# Patient Record
Sex: FEMALE | Race: OTHER | Employment: OTHER | URBAN - METROPOLITAN AREA
[De-identification: names, ages, dates, MRNs, and addresses within clinical notes are randomized per-mention and may not be internally consistent; named-entity substitution may affect disease eponyms.]

---

## 2017-02-09 ENCOUNTER — HOSPITAL ENCOUNTER (OUTPATIENT)
Dept: MRI IMAGING | Age: 82
Discharge: HOME | End: 2017-02-09
Attending: FAMILY MEDICINE
Payer: MEDICARE

## 2017-02-09 DIAGNOSIS — M25.519 SHOULDER PAIN: ICD-10-CM

## 2017-02-09 PROCEDURE — 73221 MRI JOINT UPR EXTREM W/O DYE: CPT

## 2017-03-18 ENCOUNTER — HOSPITAL ENCOUNTER (OUTPATIENT)
Dept: MAMMOGRAPHY | Age: 82
Discharge: HOME | End: 2017-03-18
Attending: FAMILY MEDICINE
Payer: MEDICARE

## 2017-03-18 DIAGNOSIS — Z12.39 BREAST CANCER SCREENING: ICD-10-CM

## 2017-03-18 PROCEDURE — 77067 SCR MAMMO BI INCL CAD: CPT

## 2017-03-27 ENCOUNTER — HOSPITAL ENCOUNTER (EMERGENCY)
Age: 82
Discharge: HOME | End: 2017-03-27
Attending: EMERGENCY MEDICINE
Payer: MEDICARE

## 2017-03-27 ENCOUNTER — APPOINTMENT (OUTPATIENT)
Dept: GENERAL RADIOLOGY | Age: 82
End: 2017-03-27
Attending: EMERGENCY MEDICINE
Payer: MEDICARE

## 2017-03-27 VITALS
TEMPERATURE: 98 F | HEART RATE: 82 BPM | SYSTOLIC BLOOD PRESSURE: 143 MMHG | RESPIRATION RATE: 16 BRPM | DIASTOLIC BLOOD PRESSURE: 53 MMHG | OXYGEN SATURATION: 99 %

## 2017-03-27 DIAGNOSIS — S29.019A STRAIN OF THORACIC SPINE, INITIAL ENCOUNTER: Primary | ICD-10-CM

## 2017-03-27 PROCEDURE — 99283 EMERGENCY DEPT VISIT LOW MDM: CPT

## 2017-03-27 PROCEDURE — 72072 X-RAY EXAM THORAC SPINE 3VWS: CPT

## 2017-03-27 RX ORDER — IBUPROFEN 600 MG/1
600 TABLET ORAL ONCE
Status: COMPLETED | OUTPATIENT
Start: 2017-03-27 | End: 2017-03-27

## 2017-03-27 NOTE — ED PROVIDER NOTES
Patient Seen in: THE [facility] Emergency Department In [place]    History   Patient presents with:  Back Pain (musculoskeletal)    Stated Complaint: upper back pain since yesterday, denies injury; denies chest pain, SOB     HPI    29-year-old female presents TABLET WEEKLY TAKEN 30 MINUTES BEFORE A MEAL       No family history on file.       Smoking Status: Never Smoker                      Alcohol Use: No                Review of Systems    Positive for stated complaint: upper back pain since yesterday, denies Technologist)  Sudden onset pain upper half of thoracic spine midline and left side for past 2 days. No injury. 3/27/2017  CONCLUSION:  Preservation thoracic vertebral body height. Mild dextroscoliosis with multilevel endplate hypertrophic changes.

## 2017-03-31 ENCOUNTER — HOSPITAL ENCOUNTER (OUTPATIENT)
Dept: MAMMOGRAPHY | Age: 82
Discharge: HOME | End: 2017-03-31
Attending: FAMILY MEDICINE
Payer: MEDICARE

## 2017-03-31 DIAGNOSIS — R92.8 ABNORMAL MAMMOGRAM OF RIGHT BREAST: ICD-10-CM

## 2017-03-31 PROCEDURE — 77061 BREAST TOMOSYNTHESIS UNI: CPT

## 2017-03-31 PROCEDURE — 77065 DX MAMMO INCL CAD UNI: CPT

## 2017-08-16 ENCOUNTER — OFFICE VISIT (OUTPATIENT)
Dept: PHYSICAL THERAPY | Age: 82
End: 2017-08-16
Attending: FAMILY MEDICINE
Payer: MEDICARE

## 2017-08-16 DIAGNOSIS — M25.511 BILATERAL SHOULDER PAIN: ICD-10-CM

## 2017-08-16 DIAGNOSIS — G89.29 OTHER CHRONIC PAIN: ICD-10-CM

## 2017-08-16 DIAGNOSIS — M25.512 BILATERAL SHOULDER PAIN: ICD-10-CM

## 2017-08-16 PROCEDURE — 97163 PT EVAL HIGH COMPLEX 45 MIN: CPT

## 2017-08-16 PROCEDURE — 97110 THERAPEUTIC EXERCISES: CPT

## 2017-08-16 NOTE — PROGRESS NOTES
UPPER EXTREMITY EVALUATION:   Referring Physician: Dr. Aba Ball  Diagnosis: Chronic pain B shoulders Date of Service: 8/16/2017     PATIENT SUMMARY   Sai Montenegro is a 80year old y/o female who presents to therapy today with complaints of L sided neck, s pectorals, B upper traps, B levators, B SCM, moderate tenderness in L cervical paraspinals, L upper traps and medial border of L scapula, cervical compression (+) relief of pain, cervical distraction (+) increase in pain, Spurling's (-), Full can (+) LUE, precautions, treatment plan. She was also educated on importance of compliance with HEP. Patient was instructed in and issued a HEP for: upper thoracic seated stretch with rolled towel, shoulder rolls backward, wand exercise standing flexion.  No increase agreed to actively participate in planning and for this course of care. Thank you for your referral. Please co-sign or sign and return this letter via fax as soon as possible to [phone].  If you have any questions, please contact me at Dept: [phone]

## 2017-08-23 ENCOUNTER — OFFICE VISIT (OUTPATIENT)
Dept: PHYSICAL THERAPY | Age: 82
End: 2017-08-23
Attending: FAMILY MEDICINE
Payer: MEDICARE

## 2017-08-23 DIAGNOSIS — M25.512 BILATERAL SHOULDER PAIN: ICD-10-CM

## 2017-08-23 DIAGNOSIS — G89.29 OTHER CHRONIC PAIN: ICD-10-CM

## 2017-08-23 DIAGNOSIS — M25.511 BILATERAL SHOULDER PAIN: ICD-10-CM

## 2017-08-23 PROCEDURE — 97110 THERAPEUTIC EXERCISES: CPT

## 2017-08-23 PROCEDURE — 97140 MANUAL THERAPY 1/> REGIONS: CPT

## 2017-08-23 NOTE — PROGRESS NOTES
Dx: Chronic pain B shoulders       Authorized # of Visits: NA   Next MD visit: none scheduled  Fall Risk: standard         Precautions: n/a             Subjective: Patient states that she did her HEP.  She states that there is no pain at rest but when she m

## 2017-08-24 ENCOUNTER — OFFICE VISIT (OUTPATIENT)
Dept: PHYSICAL THERAPY | Age: 82
End: 2017-08-24
Attending: FAMILY MEDICINE
Payer: MEDICARE

## 2017-08-24 DIAGNOSIS — G89.29 OTHER CHRONIC PAIN: ICD-10-CM

## 2017-08-24 DIAGNOSIS — M25.512 BILATERAL SHOULDER PAIN: ICD-10-CM

## 2017-08-24 DIAGNOSIS — M25.511 BILATERAL SHOULDER PAIN: ICD-10-CM

## 2017-08-24 PROCEDURE — 97140 MANUAL THERAPY 1/> REGIONS: CPT

## 2017-08-24 PROCEDURE — 97110 THERAPEUTIC EXERCISES: CPT

## 2017-08-24 NOTE — PROGRESS NOTES
Dx: Chronic pain B shoulders       Authorized # of Visits: NA   Next MD visit: none scheduled  Fall Risk: standard         Precautions: n/a             Subjective: Patient's  daughter-in-law states that patient was able to attend a party last night.  Nehemias HP x 15 min L cervical and scapular area X 15 min         Upper cycle 3'F 3'B                                   Skilled Services: Manual therapy, verbal cues for form during exercises    Charges: Naina 2( 30 min) MT1 ( 15 min)   Total Timed Treatment: 45 m

## 2017-08-28 ENCOUNTER — OFFICE VISIT (OUTPATIENT)
Dept: PHYSICAL THERAPY | Age: 82
End: 2017-08-28
Attending: FAMILY MEDICINE
Payer: MEDICARE

## 2017-08-28 DIAGNOSIS — M25.512 BILATERAL SHOULDER PAIN: ICD-10-CM

## 2017-08-28 DIAGNOSIS — M25.511 BILATERAL SHOULDER PAIN: ICD-10-CM

## 2017-08-28 DIAGNOSIS — G89.29 OTHER CHRONIC PAIN: ICD-10-CM

## 2017-08-28 PROCEDURE — 97110 THERAPEUTIC EXERCISES: CPT

## 2017-08-28 PROCEDURE — 97014 ELECTRIC STIMULATION THERAPY: CPT

## 2017-08-28 NOTE — PROGRESS NOTES
Dx: Chronic pain B shoulders       Authorized # of Visits: NA   Next MD visit: none scheduled  Fall Risk: standard         Precautions: n/a             Subjective: Patient's daughter-in-law states that patient had severe pain on L side of neck and headache sec x 4 each       HP x 15 min L cervical and scapular area X 15 min -        Upper cycle 3'F 3'B L=2          Red band scapular retraction x 20         Red band B shoulder ext x 20         Red band B shoulder ER at 0 deg abd x 20       Skilled Services: M

## 2017-08-30 ENCOUNTER — OFFICE VISIT (OUTPATIENT)
Dept: PHYSICAL THERAPY | Age: 82
End: 2017-08-30
Attending: FAMILY MEDICINE
Payer: MEDICARE

## 2017-08-30 DIAGNOSIS — G89.29 OTHER CHRONIC PAIN: ICD-10-CM

## 2017-08-30 DIAGNOSIS — M25.511 BILATERAL SHOULDER PAIN: ICD-10-CM

## 2017-08-30 DIAGNOSIS — M25.512 BILATERAL SHOULDER PAIN: ICD-10-CM

## 2017-08-30 PROCEDURE — 97110 THERAPEUTIC EXERCISES: CPT

## 2017-08-30 PROCEDURE — 97014 ELECTRIC STIMULATION THERAPY: CPT

## 2017-08-30 NOTE — PROGRESS NOTES
Dx: Chronic pain B shoulders       Authorized # of Visits: NA   Next MD visit: none scheduled  Fall Risk: standard         Precautions: n/a             Subjective: Patient's daughter-in-law states that patient L shoulder felt much better after the electric border of L scapula x 15 min X 15 min IFC B upper traps and medial border of scapula x 15 min with HP X 15 min      Manual stretch B upper traps, levator and SCM 15 sec x 4 each 15 sec x 4 each Swiss ball on table push to R & L 5 sec x 10      HP x 15 min

## 2017-09-06 ENCOUNTER — APPOINTMENT (OUTPATIENT)
Dept: PHYSICAL THERAPY | Age: 82
End: 2017-09-06
Attending: FAMILY MEDICINE
Payer: MEDICARE

## 2017-09-13 ENCOUNTER — APPOINTMENT (OUTPATIENT)
Dept: PHYSICAL THERAPY | Age: 82
End: 2017-09-13
Attending: FAMILY MEDICINE
Payer: MEDICARE

## 2017-09-13 ENCOUNTER — OFFICE VISIT (OUTPATIENT)
Dept: PHYSICAL THERAPY | Age: 82
End: 2017-09-13
Attending: FAMILY MEDICINE
Payer: MEDICARE

## 2017-09-13 PROCEDURE — 97110 THERAPEUTIC EXERCISES: CPT

## 2017-09-13 PROCEDURE — 97014 ELECTRIC STIMULATION THERAPY: CPT

## 2017-09-13 NOTE — PROGRESS NOTES
Dx: Chronic pain B shoulders       Authorized # of Visits: NA   Next MD visit: none scheduled  Fall Risk: standard         Precautions: n/a             Subjective: Patient's daughter-in-law states that according to patient, she has good and bad days.  Mario B upper traps, medial border of L scapula x 15 min X 15 min IFC B upper traps and medial border of scapula x 15 min with HP X 15 min X 15 min     Manual stretch B upper traps, levator and SCM 15 sec x 4 each 15 sec x 4 each Swiss ball on table push to R &

## 2017-09-14 ENCOUNTER — OFFICE VISIT (OUTPATIENT)
Dept: PHYSICAL THERAPY | Age: 82
End: 2017-09-14
Attending: FAMILY MEDICINE
Payer: MEDICARE

## 2017-09-14 ENCOUNTER — APPOINTMENT (OUTPATIENT)
Dept: PHYSICAL THERAPY | Age: 82
End: 2017-09-14
Attending: FAMILY MEDICINE
Payer: MEDICARE

## 2017-09-14 PROCEDURE — 97110 THERAPEUTIC EXERCISES: CPT

## 2017-09-14 PROCEDURE — 97014 ELECTRIC STIMULATION THERAPY: CPT

## 2017-09-14 NOTE — PROGRESS NOTES
Dx: Chronic pain B shoulders       Authorized # of Visits: NA   Next MD visit: none scheduled  Fall Risk: standard         Precautions: n/a            Discharge Summary    Pt has attended 7, cancelled 0, and no shown 0 visits in Physical Therapy.      FOTO HEP to maintain progress achieved in PT.- Met    Goal status G Code:  CK  Discharge status G Code:  CO     Plan: Most goals set during initial evaluation have been met.  Patient was encouraged to be consistently compliant with given HEP to [facility] each 15 sec x 4 each Swiss ball on table push to R & L 5 sec x 10 5 sec x 10 5 sec x 10    HP x 15 min L cervical and scapular area X 15 min - - - -     Upper cycle 3'F 3'B L=2  L=2 3'F, 3'B L=2   4'F, 4'B 4'F 4'B      Red band scapular retraction x 20 X 2

## 2018-01-05 ENCOUNTER — HOSPITAL ENCOUNTER (OUTPATIENT)
Dept: BONE DENSITY | Age: 83
Discharge: HOME | End: 2018-01-05
Attending: FAMILY MEDICINE
Payer: MEDICARE

## 2018-01-05 DIAGNOSIS — M81.0 OSTEOPOROSIS: ICD-10-CM

## 2018-01-05 PROCEDURE — 77080 DXA BONE DENSITY AXIAL: CPT | Performed by: FAMILY MEDICINE

## 2018-03-19 ENCOUNTER — HOSPITAL ENCOUNTER (OUTPATIENT)
Dept: MAMMOGRAPHY | Age: 83
Discharge: HOME | End: 2018-03-19
Attending: FAMILY MEDICINE
Payer: MEDICARE

## 2018-03-19 DIAGNOSIS — Z12.31 ENCOUNTER FOR SCREENING MAMMOGRAM FOR MALIGNANT NEOPLASM OF BREAST: ICD-10-CM

## 2018-03-19 PROCEDURE — 77067 SCR MAMMO BI INCL CAD: CPT | Performed by: FAMILY MEDICINE

## 2018-04-07 ENCOUNTER — HOSPITAL ENCOUNTER (EMERGENCY)
Age: 83
Discharge: HOME | End: 2018-04-07
Attending: EMERGENCY MEDICINE
Payer: MEDICARE

## 2018-04-07 ENCOUNTER — APPOINTMENT (OUTPATIENT)
Dept: CT IMAGING | Age: 83
End: 2018-04-07
Attending: EMERGENCY MEDICINE
Payer: MEDICARE

## 2018-04-07 ENCOUNTER — APPOINTMENT (OUTPATIENT)
Dept: GENERAL RADIOLOGY | Age: 83
End: 2018-04-07
Attending: EMERGENCY MEDICINE
Payer: MEDICARE

## 2018-04-07 VITALS
WEIGHT: 118 LBS | SYSTOLIC BLOOD PRESSURE: 113 MMHG | DIASTOLIC BLOOD PRESSURE: 49 MMHG | HEART RATE: 77 BPM | HEIGHT: 60 IN | BODY MASS INDEX: 23.16 KG/M2 | TEMPERATURE: 98 F | RESPIRATION RATE: 16 BRPM | OXYGEN SATURATION: 97 %

## 2018-04-07 DIAGNOSIS — N30.00 ACUTE CYSTITIS WITHOUT HEMATURIA: ICD-10-CM

## 2018-04-07 DIAGNOSIS — R07.9 RIGHT-SIDED CHEST PAIN: Primary | ICD-10-CM

## 2018-04-07 DIAGNOSIS — K21.9 GASTROESOPHAGEAL REFLUX DISEASE, ESOPHAGITIS PRESENCE NOT SPECIFIED: ICD-10-CM

## 2018-04-07 PROCEDURE — 99285 EMERGENCY DEPT VISIT HI MDM: CPT

## 2018-04-07 PROCEDURE — 81001 URINALYSIS AUTO W/SCOPE: CPT | Performed by: EMERGENCY MEDICINE

## 2018-04-07 PROCEDURE — 36415 COLL VENOUS BLD VENIPUNCTURE: CPT

## 2018-04-07 PROCEDURE — 93005 ELECTROCARDIOGRAM TRACING: CPT

## 2018-04-07 PROCEDURE — 93010 ELECTROCARDIOGRAM REPORT: CPT

## 2018-04-07 PROCEDURE — 80053 COMPREHEN METABOLIC PANEL: CPT | Performed by: EMERGENCY MEDICINE

## 2018-04-07 PROCEDURE — 87086 URINE CULTURE/COLONY COUNT: CPT | Performed by: EMERGENCY MEDICINE

## 2018-04-07 PROCEDURE — 71275 CT ANGIOGRAPHY CHEST: CPT | Performed by: EMERGENCY MEDICINE

## 2018-04-07 PROCEDURE — 85025 COMPLETE CBC W/AUTO DIFF WBC: CPT | Performed by: EMERGENCY MEDICINE

## 2018-04-07 PROCEDURE — 85378 FIBRIN DEGRADE SEMIQUANT: CPT | Performed by: EMERGENCY MEDICINE

## 2018-04-07 PROCEDURE — 84484 ASSAY OF TROPONIN QUANT: CPT | Performed by: EMERGENCY MEDICINE

## 2018-04-07 PROCEDURE — 71046 X-RAY EXAM CHEST 2 VIEWS: CPT | Performed by: EMERGENCY MEDICINE

## 2018-04-07 RX ORDER — NITROFURANTOIN 25; 75 MG/1; MG/1
100 CAPSULE ORAL 2 TIMES DAILY
Qty: 20 CAPSULE | Refills: 0 | Status: SHIPPED | OUTPATIENT
Start: 2018-04-07 | End: 2018-04-17

## 2018-04-07 NOTE — ED INITIAL ASSESSMENT (HPI)
2 months of bilateral breast pain,greater on the right with mild chest palpation, denies cough or fever

## 2018-04-07 NOTE — ED PROVIDER NOTES
Patient Seen in: [facility] Emergency Department In [place]    History   Patient presents with:  Rash Skin Problem (integumentary)  Breast Problem (mammary)    Stated Complaint: painful rash    HPI     Should not presents with right chest pain.   The patie (5')   Wt 53.5 kg   SpO2 99%   BMI 23.05 kg/m²         Physical Exam    General: Awake and alert, in no acute distress. HEENT: Normocephalic, atraumatic, pupils equal round and reactive to light, oropharynx clear, uvula midline. Neck: Supple.   Cardiovasc ------                     CBC W/ DIFFERENTIAL[[id number]]                              Final result                 Please view results for these tests on the individual orders.    RAINBOW DRAW BLUE   RAINBOW DRAW LAVENDER   RAINBOW DRAW LIGHT GREEN AORTA:  No thoracic aortic aneurysm. LUNGS:  Mild respiratory motion Bilateral subsegmental atelectasis. No focal pulmonary infiltrate or consolidation. . MEDIASTINUM/FLY:  Slight interval enlargement of the right soft tissue in the anterior neck mass deep was evaluated with a computer-aided device.   This patient's information has been entered into a reminder system with a target due date for the next mammogram.      Dictated by: Lupis Babcock MD on 3/19/2018 at 14:36     Approved by: Lupis Babcock MD

## 2018-06-09 ENCOUNTER — APPOINTMENT (OUTPATIENT)
Dept: GENERAL RADIOLOGY | Age: 83
End: 2018-06-09
Attending: EMERGENCY MEDICINE
Payer: MEDICARE

## 2018-06-09 ENCOUNTER — HOSPITAL ENCOUNTER (EMERGENCY)
Age: 83
Discharge: HOME | End: 2018-06-09
Attending: EMERGENCY MEDICINE
Payer: MEDICARE

## 2018-06-09 VITALS
WEIGHT: 120 LBS | SYSTOLIC BLOOD PRESSURE: 132 MMHG | HEIGHT: 60 IN | HEART RATE: 74 BPM | RESPIRATION RATE: 18 BRPM | BODY MASS INDEX: 23.56 KG/M2 | DIASTOLIC BLOOD PRESSURE: 65 MMHG | OXYGEN SATURATION: 96 % | TEMPERATURE: 98 F

## 2018-06-09 DIAGNOSIS — M54.6 ACUTE BILATERAL THORACIC BACK PAIN: Primary | ICD-10-CM

## 2018-06-09 PROCEDURE — 85025 COMPLETE CBC W/AUTO DIFF WBC: CPT | Performed by: EMERGENCY MEDICINE

## 2018-06-09 PROCEDURE — 71046 X-RAY EXAM CHEST 2 VIEWS: CPT | Performed by: EMERGENCY MEDICINE

## 2018-06-09 PROCEDURE — 93005 ELECTROCARDIOGRAM TRACING: CPT

## 2018-06-09 PROCEDURE — 93010 ELECTROCARDIOGRAM REPORT: CPT

## 2018-06-09 PROCEDURE — 72050 X-RAY EXAM NECK SPINE 4/5VWS: CPT | Performed by: EMERGENCY MEDICINE

## 2018-06-09 PROCEDURE — 96374 THER/PROPH/DIAG INJ IV PUSH: CPT

## 2018-06-09 PROCEDURE — 99285 EMERGENCY DEPT VISIT HI MDM: CPT

## 2018-06-09 PROCEDURE — 80048 BASIC METABOLIC PNL TOTAL CA: CPT | Performed by: EMERGENCY MEDICINE

## 2018-06-09 PROCEDURE — 84484 ASSAY OF TROPONIN QUANT: CPT | Performed by: EMERGENCY MEDICINE

## 2018-06-09 RX ORDER — METHYLPREDNISOLONE 4 MG/1
TABLET ORAL
Qty: 1 PACKAGE | Refills: 0 | Status: SHIPPED | OUTPATIENT
Start: 2018-06-09 | End: 2018-06-14

## 2018-06-09 RX ORDER — KETOROLAC TROMETHAMINE 30 MG/ML
15 INJECTION, SOLUTION INTRAMUSCULAR; INTRAVENOUS ONCE
Status: COMPLETED | OUTPATIENT
Start: 2018-06-09 | End: 2018-06-09

## 2018-06-09 RX ORDER — TRAMADOL HYDROCHLORIDE 50 MG/1
TABLET ORAL EVERY 4 HOURS PRN
Qty: 10 TABLET | Refills: 0 | Status: SHIPPED | OUTPATIENT
Start: 2018-06-09 | End: 2018-06-16

## 2018-06-09 NOTE — ED PROVIDER NOTES
Patient Seen in: [facility] In [place]    History   Patient presents with:  Back Pain (musculoskeletal)    Stated Complaint: back and shoulder pain     HPI    Patient here for evaluation of neck and bilateral shoulder pain.    pain goe Eyes: EOM are normal. Pupils are equal, round, and reactive to light. Neck: Normal range of motion. Neck supple. No JVD present. Cardiovascular: Normal rate and regular rhythm. Pulmonary/Chest: Effort normal and breath sounds normal. No stridor. Course as of Jun 09 1328  ------------------------------------------------------------      MDM       Strongly suspect muscular skeletal etiology of her pain, but given her age we did do an EKG and some blood work.   We will also obtain X.  EKG is reassurin Print Script, Disp-10 tablet, R-0    methylPREDNISolone 4 MG Oral Tablet Therapy Pack  Dosepack: use as directed on packaging, Normal, Disp-1 Package, R-0

## 2018-06-09 NOTE — ED INITIAL ASSESSMENT (HPI)
Pt c/o upper,mid back, bilateral shoulders and neck pain that started this morning. Denies injury or fall. Pt denies SOB, chest pain.  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

## 2018-07-09 ENCOUNTER — OFFICE VISIT (OUTPATIENT)
Dept: PHYSICAL THERAPY | Age: 83
End: 2018-07-09
Attending: FAMILY MEDICINE
Payer: MEDICARE

## 2018-07-09 DIAGNOSIS — M75.02 ADHESIVE BURSITIS OF LEFT SHOULDER: ICD-10-CM

## 2018-07-09 PROCEDURE — 97163 PT EVAL HIGH COMPLEX 45 MIN: CPT

## 2018-07-09 PROCEDURE — 97110 THERAPEUTIC EXERCISES: CPT

## 2018-07-09 NOTE — PROGRESS NOTES
UPPER EXTREMITY EVALUATION:   Referring Physician: Dr. Isabelle Butts  Diagnosis: adhesive capsulitis L  shoulder  Date of Service: 7/9/2018     PATIENT SUMMARY   Rosa Younger is a [de-identified]year old y/o female who presents to therapy today with complaints of LEILA.  Evaluation findings are consistent with diagnosis. Tomasa Knox would benefit from skilled Physical Therapy to address the above impairments and to work on goals listed below.     Precautions:  None  OBJECTIVE:   Observation/Posture: forward head, rounded s Edinson High Complexity, Thera Ex1      Total Timed Treatment: 15 min     Total Treatment Time: 50 min     PLAN OF CARE:    Goals:  (to be met in 10 visits)  · Pt will report improved ability to sleep without waking due to shoulder pain.   · Pt will improve sh [phone]    Sincerely,  Electronically signed by therapist: Maria C Gross PT    [de-identified] certification required: Yes  I certify the need for these services furnished under this plan of treatment and while under my care.     X____________________________

## 2018-07-11 ENCOUNTER — OFFICE VISIT (OUTPATIENT)
Dept: PHYSICAL THERAPY | Age: 83
End: 2018-07-11
Attending: FAMILY MEDICINE
Payer: MEDICARE

## 2018-07-11 PROCEDURE — 97014 ELECTRIC STIMULATION THERAPY: CPT

## 2018-07-11 PROCEDURE — 97110 THERAPEUTIC EXERCISES: CPT

## 2018-07-11 NOTE — PROGRESS NOTES
Dx: adhesive capsulitis L  shoulder        Authorized # of Visits:  10 requested       Next MD visit: none scheduled  Fall Risk: standard         Precautions: n/a             Subjective: Patient reports compliance with HEP.  She states that L shoulder feels session. Date: 7/11/2018  Tx#: 2/ Date: Tx#: 3/ Date: Tx#: 4/ Date: Tx#: 5/ Date: Tx#: 6/ Date: Tx#: 7/ Date:    Tx#: 8/   Upper cycle x 6 min         Overhead pulley shoulder flexion x 20         Overhead shoulder abd (scaption) x 20         S

## 2018-07-16 ENCOUNTER — OFFICE VISIT (OUTPATIENT)
Dept: PHYSICAL THERAPY | Age: 83
End: 2018-07-16
Attending: FAMILY MEDICINE
Payer: MEDICARE

## 2018-07-16 ENCOUNTER — APPOINTMENT (OUTPATIENT)
Dept: PHYSICAL THERAPY | Age: 83
End: 2018-07-16
Payer: MEDICARE

## 2018-07-16 ENCOUNTER — TELEPHONE (OUTPATIENT)
Dept: PHYSICAL THERAPY | Age: 83
End: 2018-07-16

## 2018-07-16 PROCEDURE — 97014 ELECTRIC STIMULATION THERAPY: CPT

## 2018-07-16 PROCEDURE — 97110 THERAPEUTIC EXERCISES: CPT

## 2018-07-16 NOTE — PROGRESS NOTES
Dx: adhesive capsulitis L  shoulder        Authorized # of Visits:  10 requested       Next MD visit: none scheduled  Fall Risk: standard         Precautions: n/a             Subjective: Patient states that she went back to the doctor today to have the lum 6 min        Overhead pulley shoulder flexion x 20 X 20        Overhead shoulder abd (scaption) x 20 X 20        Standing AAROM with dowel B shoulder flexion 2 x 10 Yellow band scapular retraction x 20        Standing AAROM with dowel L shoulder abd 2 x 10

## 2018-07-18 ENCOUNTER — OFFICE VISIT (OUTPATIENT)
Dept: PHYSICAL THERAPY | Age: 83
End: 2018-07-18
Attending: FAMILY MEDICINE
Payer: MEDICARE

## 2018-07-18 PROCEDURE — 97014 ELECTRIC STIMULATION THERAPY: CPT

## 2018-07-18 PROCEDURE — 97110 THERAPEUTIC EXERCISES: CPT

## 2018-07-18 NOTE — PROGRESS NOTES
Dx: adhesive capsulitis L  shoulder        Authorized # of Visits:  10 requested       Next MD visit: none scheduled  Fall Risk: standard         Precautions: n/a             Subjective: Patient states that L shoulder feels better.  She states that pain is (scaption) x 20 X 20 X 20       Standing AAROM with dowel B shoulder flexion 2 x 10 Yellow band scapular retraction x 20 X 20       Standing AAROM with dowel L shoulder abd 2 x 10 Yellow band B shoulder ext x 20 X 20       Standing B serratus punch with do

## 2018-07-23 ENCOUNTER — OFFICE VISIT (OUTPATIENT)
Dept: PHYSICAL THERAPY | Age: 83
End: 2018-07-23
Attending: FAMILY MEDICINE
Payer: MEDICARE

## 2018-07-23 PROCEDURE — 97110 THERAPEUTIC EXERCISES: CPT

## 2018-07-23 NOTE — PROGRESS NOTES
Dx: adhesive capsulitis L  shoulder        Authorized # of Visits:  10 requested       Next MD visit: none scheduled  Fall Risk: standard         Precautions: n/a             Subjective: Patient states that her L shoulder continues to feel better.  She stat X 20 X 20 X 20      Standing AAROM with dowel B shoulder flexion 2 x 10 Yellow band scapular retraction x 20 X 20 Red band x 20      Standing AAROM with dowel L shoulder abd 2 x 10 Yellow band B shoulder ext x 20 X 20 Red band x 20      Standing B serratus

## 2018-07-25 ENCOUNTER — OFFICE VISIT (OUTPATIENT)
Dept: PHYSICAL THERAPY | Age: 83
End: 2018-07-25
Attending: FAMILY MEDICINE
Payer: MEDICARE

## 2018-07-25 PROCEDURE — 97110 THERAPEUTIC EXERCISES: CPT

## 2018-07-25 PROCEDURE — 97014 ELECTRIC STIMULATION THERAPY: CPT

## 2018-07-25 NOTE — PROGRESS NOTES
Dx: adhesive capsulitis L  shoulder        Authorized # of Visits:  10 requested       Next MD visit: none scheduled  Fall Risk: standard         Precautions: n/a             Subjective: Patient states that L shoulder is hurting today.  She states that she retraction x 20 X 20 Red band x 20 X 20     Standing AAROM with dowel L shoulder abd 2 x 10 Yellow band B shoulder ext x 20 X 20 Red band x 20 X 20     Standing B serratus punch with dowel 2 x 10 Yellow band B shoulder ER at 0 deg abd x 20 X 20 Red band x

## 2018-07-30 ENCOUNTER — OFFICE VISIT (OUTPATIENT)
Dept: PHYSICAL THERAPY | Age: 83
End: 2018-07-30
Attending: FAMILY MEDICINE
Payer: MEDICARE

## 2018-07-30 PROCEDURE — 97110 THERAPEUTIC EXERCISES: CPT

## 2018-07-30 PROCEDURE — 97014 ELECTRIC STIMULATION THERAPY: CPT

## 2018-07-30 NOTE — PROGRESS NOTES
Dx: adhesive capsulitis L  shoulder        Authorized # of Visits:  10 requested       Next MD visit: none scheduled  Fall Risk: standard         Precautions: n/a             Subjective: Patient states that L shoulder does not feel too bad today.  She state x 10 Yellow band scapular retraction x 20 X 20 Red band x 20 X 20 2 x 15    Standing AAROM with dowel L shoulder abd 2 x 10 Yellow band B shoulder ext x 20 X 20 Red band x 20 X 20 2 x 15    Standing B serratus punch with dowel 2 x 10 Yellow band B shoulder

## 2018-08-01 ENCOUNTER — OFFICE VISIT (OUTPATIENT)
Dept: PHYSICAL THERAPY | Age: 83
End: 2018-08-01
Attending: FAMILY MEDICINE
Payer: MEDICARE

## 2018-08-01 PROCEDURE — 97110 THERAPEUTIC EXERCISES: CPT

## 2018-08-01 PROCEDURE — 97014 ELECTRIC STIMULATION THERAPY: CPT

## 2018-08-01 NOTE — PROGRESS NOTES
Dx: adhesive capsulitis L  shoulder        Authorized # of Visits:  10 requested       Next MD visit: none scheduled  Fall Risk: standard         Precautions: n/a             Subjective: Patient states that L shoulder and scapular region is more sore today Overhead shoulder abd (scaption) x 20 X 20 X 20 X 20 X 20 X 25 X 25   Standing AAROM with dowel B shoulder flexion 2 x 10 Yellow band scapular retraction x 20 X 20 Red band x 20 X 20 2 x 15 Green band 2 x 10   Standing AAROM with dowel L shoulder abd 2 x

## 2018-08-09 ENCOUNTER — OFFICE VISIT (OUTPATIENT)
Dept: PHYSICAL THERAPY | Age: 83
End: 2018-08-09
Attending: FAMILY MEDICINE
Payer: MEDICARE

## 2018-08-09 PROCEDURE — 97014 ELECTRIC STIMULATION THERAPY: CPT

## 2018-08-09 PROCEDURE — 97110 THERAPEUTIC EXERCISES: CPT

## 2018-08-09 NOTE — PROGRESS NOTES
Dx: adhesive capsulitis L  shoulder        Authorized # of Visits:  10 requested       Next MD visit: none scheduled  Fall Risk: standard         Precautions: n/a             Subjective: Patient states that B shoulders are hurting today, about an 8/10 in i Upper cycle x 6 min X 6 min X 6 min X 6 min X 6 min X 7 min X 6 min X 7 min    Overhead pulley shoulder flexion x 20 X 20 X 20 X 20 X 20 X 25 X 25  X 25    Overhead shoulder abd (scaption) x 20 X 20 X 20 X 20 X 20 X 25 X 25 X 25    Standing AAROM with do

## 2018-08-15 ENCOUNTER — OFFICE VISIT (OUTPATIENT)
Dept: PHYSICAL THERAPY | Age: 83
End: 2018-08-15
Attending: FAMILY MEDICINE
Payer: MEDICARE

## 2018-08-15 PROCEDURE — 97110 THERAPEUTIC EXERCISES: CPT

## 2018-08-15 PROCEDURE — 97014 ELECTRIC STIMULATION THERAPY: CPT

## 2018-08-15 NOTE — PROGRESS NOTES
Dx: adhesive capsulitis L  shoulder        Authorized # of Visits:  10 requested       Next MD visit: none scheduled  Fall Risk: standard         Precautions: n/a            Discharge Summary    Pt has attended 10, cancelled 0, and no shown 0 visits in y achieved in PT.- Met    Discharge G Code: Carrying, Moving and Handling Objects CJ: 20-39% impaired, limited, or restricted  Projected G Code: Carrying, Moving and Handling Objects CK: 40-59% impaired, limited, or restricted    Plan: All goals set during i shelf of cabinet to overhead shelf 2 x 10 R 2 x 10   Standing B serratus punch with dowel 2 x 10 Yellow band B shoulder ER at 0 deg abd x 20 X 20 Red band x 20 X 20 2 x 15 Green band 2 x 10 2 x 12 2 x 12   AP glide L GHJ Grade III 30 sec x 2 Grade III 30 s

## 2019-03-29 ENCOUNTER — HOSPITAL ENCOUNTER (EMERGENCY)
Age: 84
Discharge: HOME | End: 2019-03-29
Attending: EMERGENCY MEDICINE
Payer: MEDICARE

## 2019-03-29 ENCOUNTER — APPOINTMENT (OUTPATIENT)
Dept: GENERAL RADIOLOGY | Age: 84
End: 2019-03-29
Attending: EMERGENCY MEDICINE
Payer: MEDICARE

## 2019-03-29 VITALS
SYSTOLIC BLOOD PRESSURE: 108 MMHG | WEIGHT: 120.13 LBS | DIASTOLIC BLOOD PRESSURE: 66 MMHG | BODY MASS INDEX: 23 KG/M2 | TEMPERATURE: 99 F | HEART RATE: 72 BPM | OXYGEN SATURATION: 98 % | RESPIRATION RATE: 16 BRPM

## 2019-03-29 DIAGNOSIS — R07.89 CHEST WALL PAIN: Primary | ICD-10-CM

## 2019-03-29 PROCEDURE — 99285 EMERGENCY DEPT VISIT HI MDM: CPT

## 2019-03-29 PROCEDURE — 93010 ELECTROCARDIOGRAM REPORT: CPT

## 2019-03-29 PROCEDURE — 96374 THER/PROPH/DIAG INJ IV PUSH: CPT

## 2019-03-29 PROCEDURE — 85025 COMPLETE CBC W/AUTO DIFF WBC: CPT | Performed by: EMERGENCY MEDICINE

## 2019-03-29 PROCEDURE — 71046 X-RAY EXAM CHEST 2 VIEWS: CPT | Performed by: EMERGENCY MEDICINE

## 2019-03-29 PROCEDURE — 80053 COMPREHEN METABOLIC PANEL: CPT | Performed by: EMERGENCY MEDICINE

## 2019-03-29 PROCEDURE — 84484 ASSAY OF TROPONIN QUANT: CPT | Performed by: EMERGENCY MEDICINE

## 2019-03-29 PROCEDURE — 93005 ELECTROCARDIOGRAM TRACING: CPT

## 2019-03-29 RX ORDER — CYCLOBENZAPRINE HCL 5 MG
5 TABLET ORAL 3 TIMES DAILY PRN
Qty: 15 TABLET | Refills: 0 | Status: SHIPPED | OUTPATIENT
Start: 2019-03-29

## 2019-03-29 RX ORDER — KETOROLAC TROMETHAMINE 30 MG/ML
30 INJECTION, SOLUTION INTRAMUSCULAR; INTRAVENOUS ONCE
Status: COMPLETED | OUTPATIENT
Start: 2019-03-29 | End: 2019-03-29

## 2019-03-29 NOTE — ED PROVIDER NOTES
Patient Seen in: [facility] In [place]    History   Patient presents with:  Chest Pain Angina (cardiovascular)    Stated Complaint: chest pain    HPI    74-year-old female presents with chest pain.   Symptoms began 1 day ago and were a moist   Neck: supple, no rigidity   Lungs: good air exchange and clear   Heart: regular rate rhythm and no murmur. There is tenderness on palpation over the anterior chest wall on the left side.   There is also tenderness along the trapezius muscles in the radiographs were obtained. PATIENT STATED HISTORY: (As transcribed by Technologist)  Chest pain for 2 days. FINDINGS:  LUNGS:  No focal consolidation. Normal vascularity. CARDIAC:  Normal size cardiac silhouette.  MEDIASTINUM:  Hernia appearing similar present. There was no indication for further evaluation, treatment or admission on an emergency basis. Comprehensive verbal and written discharge and follow-up instructions were provided to help prevent relapse or worsening.   Patient was instructed to fo

## 2019-03-29 NOTE — ED INITIAL ASSESSMENT (HPI)
C/o intermittent left chest pain radiates to back for 2 days worse on palpation. C/o left arm numbness also.  Denies SOB

## 2019-04-28 ENCOUNTER — APPOINTMENT (OUTPATIENT)
Dept: GENERAL RADIOLOGY | Age: 84
End: 2019-04-28
Attending: EMERGENCY MEDICINE
Payer: MEDICARE

## 2019-04-28 ENCOUNTER — HOSPITAL ENCOUNTER (EMERGENCY)
Age: 84
Discharge: HOME | End: 2019-04-28
Attending: EMERGENCY MEDICINE
Payer: MEDICARE

## 2019-04-28 VITALS
WEIGHT: 124 LBS | RESPIRATION RATE: 22 BRPM | SYSTOLIC BLOOD PRESSURE: 133 MMHG | HEIGHT: 61 IN | TEMPERATURE: 97 F | HEART RATE: 71 BPM | OXYGEN SATURATION: 98 % | DIASTOLIC BLOOD PRESSURE: 71 MMHG | BODY MASS INDEX: 23.41 KG/M2

## 2019-04-28 DIAGNOSIS — M54.9 BACK PAIN WITHOUT RADIATION: Primary | ICD-10-CM

## 2019-04-28 PROCEDURE — 99283 EMERGENCY DEPT VISIT LOW MDM: CPT

## 2019-04-28 PROCEDURE — 72100 X-RAY EXAM L-S SPINE 2/3 VWS: CPT | Performed by: EMERGENCY MEDICINE

## 2019-04-28 RX ORDER — CYCLOBENZAPRINE HCL 10 MG
10 TABLET ORAL 3 TIMES DAILY PRN
Qty: 20 TABLET | Refills: 0 | Status: SHIPPED | OUTPATIENT
Start: 2019-04-28

## 2019-04-28 RX ORDER — IBUPROFEN 600 MG/1
600 TABLET ORAL EVERY 8 HOURS PRN
Qty: 30 TABLET | Refills: 0 | Status: SHIPPED | OUTPATIENT
Start: 2019-04-28 | End: 2019-05-05

## 2019-04-28 NOTE — ED PROVIDER NOTES
Patient Seen in: [facility] In [place]    History   Patient presents with:  Fall (musculoskeletal, neurologic)    Stated Complaint: fall down 2 stairs at home on her back, denies hitting head, LOC; takes aspirin    HPI    82year-old and nontender, no JVD, trachea midline, No LAD  Heart: S1S2 normal. No murmurs, regular rate and rhythm, nontender  Lungs: Clear to auscultation bilaterally  Abdomen: Soft nontender nondistended normal active bowel sounds without rebound, guarding or alexandra Impression:  Back pain without radiation  (primary encounter diagnosis)    Disposition:  Discharge  4/28/2019  7:02 pm    Follow-up:  Ivy Zelaya MD  [address] [place] [id number] [phone]    Schedule an appointment as soon as poss

## 2019-04-28 NOTE — ED INITIAL ASSESSMENT (HPI)
Pt's son states that pt fell down 2 stairs today at home. Denies hitting head, LOC. Pt c/o mid to lower back pain with a \"reddened\" area. States she takes aspirin. Pt is Japanese-speaking.

## 2019-04-30 ENCOUNTER — HOSPITAL ENCOUNTER (OUTPATIENT)
Dept: MAMMOGRAPHY | Age: 84
Discharge: HOME | End: 2019-04-30
Attending: FAMILY MEDICINE
Payer: MEDICARE

## 2019-04-30 DIAGNOSIS — Z12.39 ENCOUNTER FOR SCREENING FOR MALIGNANT NEOPLASM OF BREAST: ICD-10-CM

## 2019-04-30 PROCEDURE — 77063 BREAST TOMOSYNTHESIS BI: CPT | Performed by: FAMILY MEDICINE

## 2019-04-30 PROCEDURE — 77067 SCR MAMMO BI INCL CAD: CPT | Performed by: FAMILY MEDICINE

## 2019-06-27 ENCOUNTER — APPOINTMENT (OUTPATIENT)
Dept: GENERAL RADIOLOGY | Age: 84
End: 2019-06-27
Attending: PHYSICIAN ASSISTANT
Payer: MEDICARE

## 2019-06-27 ENCOUNTER — HOSPITAL ENCOUNTER (EMERGENCY)
Age: 84
Discharge: HOME | End: 2019-06-27
Payer: MEDICARE

## 2019-06-27 VITALS
WEIGHT: 120 LBS | HEIGHT: 60 IN | BODY MASS INDEX: 23.56 KG/M2 | HEART RATE: 77 BPM | DIASTOLIC BLOOD PRESSURE: 56 MMHG | OXYGEN SATURATION: 97 % | RESPIRATION RATE: 16 BRPM | TEMPERATURE: 98 F | SYSTOLIC BLOOD PRESSURE: 132 MMHG

## 2019-06-27 DIAGNOSIS — M79.89 THUMB SWELLING: Primary | ICD-10-CM

## 2019-06-27 PROCEDURE — 99283 EMERGENCY DEPT VISIT LOW MDM: CPT

## 2019-06-27 PROCEDURE — 73140 X-RAY EXAM OF FINGER(S): CPT | Performed by: PHYSICIAN ASSISTANT

## 2019-06-27 RX ORDER — PREDNISONE 20 MG/1
40 TABLET ORAL DAILY
Qty: 10 TABLET | Refills: 0 | Status: SHIPPED | OUTPATIENT
Start: 2019-06-27 | End: 2019-07-02

## 2019-06-27 RX ORDER — CEPHALEXIN 500 MG/1
500 CAPSULE ORAL 3 TIMES DAILY
Qty: 21 CAPSULE | Refills: 0 | Status: SHIPPED | OUTPATIENT
Start: 2019-06-27 | End: 2019-07-04

## 2019-06-27 NOTE — ED INITIAL ASSESSMENT (HPI)
Woke up with swelling to left thumb x 2 days. No known injury/trauma. States she is unable to bend thumb.   +pain

## 2019-06-27 NOTE — ED PROVIDER NOTES
Patient Seen in: [facility] Department In [place]    History   Patient presents with:  Upper Extremity Injury (musculoskeletal)    Stated Complaint: LEFT THUMB SWELLING NO KNOWN INJ    HPI    Patient is a 68-year-old female.   She arrives with he and pain to palpation isolated to the PIP joint of the left thumb. MCP joint is spared. No sign lymphangitis. Normal cap refill. Back: Full range of motion  Skin: Erythema, warmth and swelling to the PIP joint of the left thumb.   Neuro: Cranial nerves [address] [id number]  [phone]    In 3 days          Medications Prescribed:  Current Discharge Medication List    START taking these medications    predniSONE 20 MG Oral Tab  Take 2 tablets (40 mg total) by mouth daily for 5 days.   Qty: 10 table

## 2020-03-24 ENCOUNTER — TELEPHONE (OUTPATIENT)
Dept: OPHTHALMOLOGY | Age: 85
End: 2020-03-24

## 2020-05-20 ENCOUNTER — APPOINTMENT (OUTPATIENT)
Dept: GENERAL RADIOLOGY | Age: 85
End: 2020-05-20
Attending: EMERGENCY MEDICINE
Payer: MEDICARE

## 2020-05-20 ENCOUNTER — HOSPITAL ENCOUNTER (EMERGENCY)
Age: 85
Discharge: HOME | End: 2020-05-20
Attending: EMERGENCY MEDICINE
Payer: MEDICARE

## 2020-05-20 ENCOUNTER — APPOINTMENT (OUTPATIENT)
Dept: CT IMAGING | Age: 85
End: 2020-05-20
Attending: EMERGENCY MEDICINE
Payer: MEDICARE

## 2020-05-20 VITALS
HEIGHT: 62 IN | HEART RATE: 86 BPM | OXYGEN SATURATION: 97 % | TEMPERATURE: 99 F | RESPIRATION RATE: 16 BRPM | WEIGHT: 120 LBS | DIASTOLIC BLOOD PRESSURE: 48 MMHG | BODY MASS INDEX: 22.08 KG/M2 | SYSTOLIC BLOOD PRESSURE: 123 MMHG

## 2020-05-20 DIAGNOSIS — S63.501A SPRAIN OF RIGHT WRIST, INITIAL ENCOUNTER: ICD-10-CM

## 2020-05-20 DIAGNOSIS — S00.81XA ABRASION OF FACE, INITIAL ENCOUNTER: ICD-10-CM

## 2020-05-20 DIAGNOSIS — S01.81XA FACIAL LACERATION, INITIAL ENCOUNTER: ICD-10-CM

## 2020-05-20 DIAGNOSIS — S00.83XA CONTUSION OF FACE, INITIAL ENCOUNTER: Primary | ICD-10-CM

## 2020-05-20 DIAGNOSIS — S60.229A CONTUSION OF HAND, UNSPECIFIED LATERALITY, INITIAL ENCOUNTER: ICD-10-CM

## 2020-05-20 DIAGNOSIS — S60.222A CONTUSION OF LEFT HAND, INITIAL ENCOUNTER: ICD-10-CM

## 2020-05-20 PROCEDURE — 70486 CT MAXILLOFACIAL W/O DYE: CPT | Performed by: EMERGENCY MEDICINE

## 2020-05-20 PROCEDURE — 90471 IMMUNIZATION ADMIN: CPT

## 2020-05-20 PROCEDURE — 73110 X-RAY EXAM OF WRIST: CPT | Performed by: EMERGENCY MEDICINE

## 2020-05-20 PROCEDURE — 70450 CT HEAD/BRAIN W/O DYE: CPT | Performed by: EMERGENCY MEDICINE

## 2020-05-20 PROCEDURE — 99285 EMERGENCY DEPT VISIT HI MDM: CPT

## 2020-05-20 PROCEDURE — 73130 X-RAY EXAM OF HAND: CPT | Performed by: EMERGENCY MEDICINE

## 2020-05-20 PROCEDURE — 76377 3D RENDER W/INTRP POSTPROCES: CPT

## 2020-05-20 PROCEDURE — 76377 3D RENDER W/INTRP POSTPROCES: CPT | Performed by: EMERGENCY MEDICINE

## 2020-05-20 PROCEDURE — 72125 CT NECK SPINE W/O DYE: CPT | Performed by: EMERGENCY MEDICINE

## 2020-05-20 RX ORDER — CEPHALEXIN 500 MG/1
500 CAPSULE ORAL 4 TIMES DAILY
Qty: 28 CAPSULE | Refills: 0 | Status: SHIPPED | OUTPATIENT
Start: 2020-05-20 | End: 2020-05-27

## 2020-05-20 NOTE — ED INITIAL ASSESSMENT (HPI)
PT WENT OUT FOR A WALK AND TRIPPED HIT HER FACE, PT WAS WEARING SUNGLASSES AND SCRAPED HER FACE FROM HER UPPER LIP TO NOSE

## 2020-05-20 NOTE — ED PROVIDER NOTES
Patient Seen in: [facility] Emergency Department In [place]      History   Patient presents with:  Trauma    Stated Complaint: tripped on sidewalk and fell on her face;lac to nose and face    HPI    Patient and her family has been going out for walks [facility]   Ht 157.5 cm (5' 2\")   Wt 54.4 kg   SpO2 97%   BMI 21.95 kg/m²         Physical Exam  General: The patient is awake, alert, conversant. Patient answers questions quietly but appropriately.     There is a deep abrasion/gouge noted just superior t food particles.         MDM   Patient had a non-syncopal fall and recalls the events  She does have contusion to the face with large but primarily superficial abrasions  Her tetanus was updated    X-ray wrist   CONCLUSION:  No evidence of acute displaced fr medications    cephALEXin 500 MG Oral Cap  Take 1 capsule (500 mg total) by mouth 4 (four) times daily for 7 days.   Qty: 28 capsule Refills: 0

## 2020-09-17 ENCOUNTER — LAB ENCOUNTER (OUTPATIENT)
Dept: LAB | Age: 85
End: 2020-09-17
Attending: FAMILY MEDICINE
Payer: MEDICARE

## 2020-09-17 DIAGNOSIS — E55.9 AVITAMINOSIS D: ICD-10-CM

## 2020-09-17 DIAGNOSIS — R10.13 ABDOMINAL PAIN, EPIGASTRIC: ICD-10-CM

## 2020-09-17 DIAGNOSIS — I10 ESSENTIAL HYPERTENSION, MALIGNANT: Primary | ICD-10-CM

## 2020-09-17 DIAGNOSIS — E78.00 PURE HYPERCHOLESTEROLEMIA: ICD-10-CM

## 2020-09-17 DIAGNOSIS — R53.82 CHRONIC FATIGUE SYNDROME: ICD-10-CM

## 2020-09-17 LAB
ALBUMIN SERPL-MCNC: 3.6 G/DL (ref 3.4–5)
ALBUMIN/GLOB SERPL: 1.1 {RATIO} (ref 1–2)
ALP LIVER SERPL-CCNC: 41 U/L (ref 55–142)
ALT SERPL-CCNC: 18 U/L (ref 13–56)
ANION GAP SERPL CALC-SCNC: 3 MMOL/L (ref 0–18)
AST SERPL-CCNC: 16 U/L (ref 15–37)
BASOPHILS # BLD AUTO: 0.02 X10(3) UL (ref 0–0.2)
BASOPHILS NFR BLD AUTO: 0.4 %
BILIRUB SERPL-MCNC: 0.5 MG/DL (ref 0.1–2)
BUN BLD-MCNC: 19 MG/DL (ref 7–18)
BUN/CREAT SERPL: 22.4 (ref 10–20)
CALCIUM BLD-MCNC: 9.4 MG/DL (ref 8.5–10.1)
CHLORIDE SERPL-SCNC: 104 MMOL/L (ref 98–112)
CHOLEST SMN-MCNC: 210 MG/DL (ref ?–200)
CO2 SERPL-SCNC: 32 MMOL/L (ref 21–32)
CREAT BLD-MCNC: 0.85 MG/DL (ref 0.55–1.02)
DEPRECATED RDW RBC AUTO: 41.8 FL (ref 35.1–46.3)
EOSINOPHIL # BLD AUTO: 0.07 X10(3) UL (ref 0–0.7)
EOSINOPHIL NFR BLD AUTO: 1.3 %
ERYTHROCYTE [DISTWIDTH] IN BLOOD BY AUTOMATED COUNT: 12.6 % (ref 11–15)
GLOBULIN PLAS-MCNC: 3.3 G/DL (ref 2.8–4.4)
GLUCOSE BLD-MCNC: 88 MG/DL (ref 70–99)
HCT VFR BLD AUTO: 42.4 % (ref 35–48)
HDLC SERPL-MCNC: 66 MG/DL (ref 40–59)
HGB BLD-MCNC: 13.9 G/DL (ref 12–16)
IMM GRANULOCYTES # BLD AUTO: 0.02 X10(3) UL (ref 0–1)
IMM GRANULOCYTES NFR BLD: 0.4 %
LDLC SERPL CALC-MCNC: 129 MG/DL (ref ?–100)
LIPASE SERPL-CCNC: 51 U/L (ref 73–393)
LYMPHOCYTES # BLD AUTO: 2 X10(3) UL (ref 1–4)
LYMPHOCYTES NFR BLD AUTO: 36.4 %
M PROTEIN MFR SERPL ELPH: 6.9 G/DL (ref 6.4–8.2)
MCH RBC QN AUTO: 29.3 PG (ref 26–34)
MCHC RBC AUTO-ENTMCNC: 32.8 G/DL (ref 31–37)
MCV RBC AUTO: 89.5 FL (ref 80–100)
MONOCYTES # BLD AUTO: 0.42 X10(3) UL (ref 0.1–1)
MONOCYTES NFR BLD AUTO: 7.7 %
NEUTROPHILS # BLD AUTO: 2.96 X10 (3) UL (ref 1.5–7.7)
NEUTROPHILS # BLD AUTO: 2.96 X10(3) UL (ref 1.5–7.7)
NEUTROPHILS NFR BLD AUTO: 53.8 %
NONHDLC SERPL-MCNC: 144 MG/DL (ref ?–130)
OSMOLALITY SERPL CALC.SUM OF ELEC: 290 MOSM/KG (ref 275–295)
PATIENT FASTING Y/N/NP: YES
PATIENT FASTING Y/N/NP: YES
PLATELET # BLD AUTO: 238 10(3)UL (ref 150–450)
POTASSIUM SERPL-SCNC: 4.2 MMOL/L (ref 3.5–5.1)
RBC # BLD AUTO: 4.74 X10(6)UL (ref 3.8–5.3)
SODIUM SERPL-SCNC: 139 MMOL/L (ref 136–145)
TRIGL SERPL-MCNC: 74 MG/DL (ref 30–149)
TSI SER-ACNC: 2.87 MIU/ML (ref 0.36–3.74)
VIT B12 SERPL-MCNC: 1192 PG/ML (ref 193–986)
VIT D+METAB SERPL-MCNC: 31.6 NG/ML (ref 30–100)
VLDLC SERPL CALC-MCNC: 15 MG/DL (ref 0–30)
WBC # BLD AUTO: 5.5 X10(3) UL (ref 4–11)

## 2020-09-17 PROCEDURE — 84443 ASSAY THYROID STIM HORMONE: CPT

## 2020-09-17 PROCEDURE — 80053 COMPREHEN METABOLIC PANEL: CPT

## 2020-09-17 PROCEDURE — 82607 VITAMIN B-12: CPT

## 2020-09-17 PROCEDURE — 36415 COLL VENOUS BLD VENIPUNCTURE: CPT

## 2020-09-17 PROCEDURE — 80061 LIPID PANEL: CPT

## 2020-09-17 PROCEDURE — 85025 COMPLETE CBC W/AUTO DIFF WBC: CPT

## 2020-09-17 PROCEDURE — 82306 VITAMIN D 25 HYDROXY: CPT

## 2020-09-17 PROCEDURE — 83690 ASSAY OF LIPASE: CPT

## 2021-07-06 ENCOUNTER — ORDER TRANSCRIPTION (OUTPATIENT)
Dept: PHYSICAL THERAPY | Facility: HOSPITAL | Age: 86
End: 2021-07-06

## 2021-07-06 DIAGNOSIS — M54.32 SCIATIC PAIN, LEFT: Primary | ICD-10-CM

## 2021-07-07 ENCOUNTER — OFFICE VISIT (OUTPATIENT)
Dept: PHYSICAL THERAPY | Age: 86
End: 2021-07-07
Attending: FAMILY MEDICINE
Payer: MEDICARE

## 2021-07-07 DIAGNOSIS — M54.32 SCIATIC PAIN, LEFT: ICD-10-CM

## 2021-07-07 PROCEDURE — 97110 THERAPEUTIC EXERCISES: CPT

## 2021-07-07 PROCEDURE — 97163 PT EVAL HIGH COMPLEX 45 MIN: CPT

## 2021-07-07 NOTE — PROGRESS NOTES
SPINE EVALUATION:   Referring Physician: Dr. Viki Taylor  Diagnosis: L sciatica Date of Service: 7/7/2021     PATIENT SUMMARY   Jamila Martinez is a 82year old female who presents to therapy today with complaints of L buttock pain & tingling with pain referrin above impairments and reach functional goals. Precautions:  language barrier  OBJECTIVE:   Observation/Posture: Slower, guarded & antalgic katerina with visible antalgia LLE. Forward shoulders, thoracic kyphosis, flattening of normal lumbar lordosis. hooklying, which were performed today as part of treatment. Attempted to teach pt to perform posterior pelvic tilts, but pt was unable to adequately perform this even with extensive manual cueing.  This may have been at least partially limited due to langua Please co-sign or sign and return this letter via fax as soon as possible to [phone].  If you have any questions, please contact me at Dept: [phone]    Sincerely,  Electronically signed by therapist: Chelsie Azar, PT    Physician's certification req

## 2021-07-07 NOTE — PATIENT INSTRUCTIONS
Luz Wetzel on your back on the bed. Exercises performed:  Belly Breathing: 10-20x  Lower trunk rotation: 10-20x. Make sure you do not push through your pain.

## 2021-07-09 ENCOUNTER — OFFICE VISIT (OUTPATIENT)
Dept: PHYSICAL THERAPY | Age: 86
End: 2021-07-09
Attending: FAMILY MEDICINE
Payer: MEDICARE

## 2021-07-09 DIAGNOSIS — M54.32 SCIATIC PAIN, LEFT: ICD-10-CM

## 2021-07-09 PROCEDURE — 97110 THERAPEUTIC EXERCISES: CPT

## 2021-07-09 NOTE — PROGRESS NOTES
Dx: L sciatica         Insurance (Authorized # of Visits):  N/A           Authorizing Physician: Dr. Jacoby Reyes  Next MD visit: none scheduled  Fall Risk: standard         Precautions: n/a             Subjective: Pt & son report no changes from IE.  Pt did HEP,

## 2021-07-12 ENCOUNTER — APPOINTMENT (OUTPATIENT)
Dept: PHYSICAL THERAPY | Age: 86
End: 2021-07-12
Attending: FAMILY MEDICINE
Payer: MEDICARE

## 2021-07-13 ENCOUNTER — OFFICE VISIT (OUTPATIENT)
Dept: PHYSICAL THERAPY | Age: 86
End: 2021-07-13
Attending: FAMILY MEDICINE
Payer: MEDICARE

## 2021-07-13 DIAGNOSIS — M54.32 SCIATIC PAIN, LEFT: Primary | ICD-10-CM

## 2021-07-13 PROCEDURE — 97140 MANUAL THERAPY 1/> REGIONS: CPT

## 2021-07-13 PROCEDURE — 97110 THERAPEUTIC EXERCISES: CPT

## 2021-07-13 NOTE — PROGRESS NOTES
Dx: L sciatica         Insurance (Authorized # of Visits):  N/A           Authorizing Physician: Dr. Miller ref.  provider found  Next MD visit: none scheduled  Fall Risk: standard         Precautions: n/a             Subjective: Pt & son report no changes from Piriformis stretch 3x30\" L performed by PT           Seated lumbar flexion stretch with SB 20x w/min A           Supine marches low abs I 30x           MANUAL THERE 10'           STM L glut med, piriformis in s/l

## 2021-07-16 ENCOUNTER — OFFICE VISIT (OUTPATIENT)
Dept: PHYSICAL THERAPY | Age: 86
End: 2021-07-16
Attending: FAMILY MEDICINE
Payer: MEDICARE

## 2021-07-16 DIAGNOSIS — M54.32 SCIATIC PAIN, LEFT: ICD-10-CM

## 2021-07-16 PROCEDURE — 97110 THERAPEUTIC EXERCISES: CPT

## 2021-07-16 NOTE — PROGRESS NOTES
Dx: L sciatica         Insurance (Authorized # of Visits):  N/A           Authorizing Physician: Dr. Kerline Will  Next MD visit: none scheduled  Fall Risk: standard         Precautions: n/a             Subjective: Pt & son report that pt has a little less pain t TX#: 5/ Date:    Tx#: 6/       THEREX 40' THEREX 30' THERE-EX 30'         diaphragmatic breathing 3' in hooklying  Nu-step 11'         LTR 3' in [address] 4' in hooklying Seated lumbar flexion stretch with SB 20x         SKTC w/SB 20x w/m

## 2021-07-20 ENCOUNTER — OFFICE VISIT (OUTPATIENT)
Dept: PHYSICAL THERAPY | Age: 86
End: 2021-07-20
Attending: FAMILY MEDICINE
Payer: MEDICARE

## 2021-07-20 DIAGNOSIS — M54.32 SCIATIC PAIN, LEFT: ICD-10-CM

## 2021-07-20 PROCEDURE — 97110 THERAPEUTIC EXERCISES: CPT

## 2021-07-20 NOTE — PROGRESS NOTES
Dx: L sciatica         Insurance (Authorized # of Visits):  N/A           Authorizing Physician: Dr. Kerline Will  Next MD visit: none scheduled  Fall Risk: standard         Precautions: n/a             Subjective: Pt & son report that pt has a little less pain t hooklying  Nu-step 11' Nu-step 10'        LTR 3' in [address] 4' in hooklying Seated lumbar flexion stretch with SB 20x Seated lumbar flexion stretch with SB 30x        SKTC w/SB 20x w/mod A SKTC w/SB 20x w/mod A Standing alternating marches 2x15 at pa

## 2021-07-23 ENCOUNTER — OFFICE VISIT (OUTPATIENT)
Dept: PHYSICAL THERAPY | Age: 86
End: 2021-07-23
Attending: FAMILY MEDICINE
Payer: MEDICARE

## 2021-07-23 DIAGNOSIS — M54.32 SCIATIC PAIN, LEFT: ICD-10-CM

## 2021-07-23 PROCEDURE — 97110 THERAPEUTIC EXERCISES: CPT

## 2021-07-23 NOTE — PROGRESS NOTES
Dx: L sciatica         Insurance (Authorized # of Visits):  N/A           Authorizing Physician: Dr. Iglesia Pollard  Next MD visit: none scheduled  Fall Risk: standard         Precautions: n/a             Subjective: Pt & son report that pt is doing \"better\"    O breathing 3' in hooklying  Nu-step 11' Nu-step 10' Recumbent bike 10' L2       LTR 3' in [address] 4' in hooklying Seated lumbar flexion stretch with SB 20x Seated lumbar flexion stretch with SB 30x L piriformis stretch 2x30\" cross-over self-performed

## 2021-07-24 ENCOUNTER — HOSPITAL ENCOUNTER (OUTPATIENT)
Dept: MAMMOGRAPHY | Age: 86
Discharge: HOME | End: 2021-07-24
Attending: FAMILY MEDICINE
Payer: MEDICARE

## 2021-07-24 DIAGNOSIS — Z12.31 BREAST CANCER SCREENING BY MAMMOGRAM: ICD-10-CM

## 2021-07-24 PROCEDURE — 77063 BREAST TOMOSYNTHESIS BI: CPT | Performed by: FAMILY MEDICINE

## 2021-07-24 PROCEDURE — 77067 SCR MAMMO BI INCL CAD: CPT | Performed by: FAMILY MEDICINE

## 2021-07-26 ENCOUNTER — OFFICE VISIT (OUTPATIENT)
Dept: PHYSICAL THERAPY | Age: 86
End: 2021-07-26
Attending: FAMILY MEDICINE
Payer: MEDICARE

## 2021-07-26 DIAGNOSIS — M54.32 SCIATIC PAIN, LEFT: ICD-10-CM

## 2021-07-26 PROCEDURE — 97110 THERAPEUTIC EXERCISES: CPT

## 2021-07-26 NOTE — PROGRESS NOTES
Dx: L sciatica         Insurance (Authorized # of Visits):  N/A           Authorizing Physician: Dr. Chrissy Shelton  Next MD visit: none scheduled  Fall Risk: standard         Precautions: n/a             Subjective: Pt & son report that pt is doing \"better\"    O cross-over self-performed      SKTC w/SB 20x w/mod A SKTC w/SB 20x w/mod A Standing alternating marches 2x15 at parallel bars Standing alternating marches 2x15 at parallel bars SKTC stretch L 2x30\" self-performed w/PT min A SKTC stretch L 2x30\" self-perf

## 2021-07-30 ENCOUNTER — OFFICE VISIT (OUTPATIENT)
Dept: PHYSICAL THERAPY | Age: 86
End: 2021-07-30
Attending: FAMILY MEDICINE
Payer: MEDICARE

## 2021-07-30 DIAGNOSIS — M54.32 SCIATIC PAIN, LEFT: ICD-10-CM

## 2021-07-30 PROCEDURE — 97110 THERAPEUTIC EXERCISES: CPT

## 2021-07-30 NOTE — PATIENT INSTRUCTIONS
Lumbar Exercise Program    Piriformis Stretch -  Repeat 3 Times, Hold 30 Seconds, Perform 2 Times a Day    SINGLE KNEE TO CHEST STRETCH - SKTC -  Repeat 3 Times, Hold 30 Seconds, Perform 2 Times a Day    LOWER TRUNK ROTATIONS - LTR - WIG WAGS - KNEE ROCKS

## 2021-07-30 NOTE — PROGRESS NOTES
Dx: L sciatica         Insurance (Authorized # of Visits):  Medicare           Authorizing Physician: Dr. Iglesia Milian MD visit: 8/13/2021  Fall Risk: standard         Precautions: language barrier       Discharge Summary  Pt has attended 8 visits in [address] [place] [address] component but not objective component)  · Pt will have decreased paraspinal mm tension to tolerate standing >15 minutes for work and home activities (MET)  · Pt will be independent and compliant with comprehensive HEP to maintain progress achieved in PT (M Nu-step 11' Nu-step 10' Recumbent bike 10' L2 Recumbent bike 6' L2 Recumbent bike 10' L2     LTR 3' in [address] 4' in hooklying Seated lumbar flexion stretch with SB 20x Seated lumbar flexion stretch with SB 30x L piriformis stretch 2x30\" cross-over HEP Review with instruction in proper performance      STM L glut med, piriformis in s/l                                             HEP: diaphragmatic breathing & LTR; new as of 7/20/2021: add hip abd guillermo w/red or green band (pt given both), & hip add sq

## 2021-08-17 ENCOUNTER — HOSPITAL ENCOUNTER (OUTPATIENT)
Dept: MRI IMAGING | Age: 86
Discharge: HOME | End: 2021-08-17
Payer: MEDICARE

## 2021-08-17 DIAGNOSIS — M54.16 LUMBAR RADICULOPATHY: ICD-10-CM

## 2021-08-17 PROCEDURE — 72148 MRI LUMBAR SPINE W/O DYE: CPT

## 2021-08-17 PROCEDURE — 72148 MRI LUMBAR SPINE W/O DYE: CPT | Performed by: ANESTHESIOLOGY

## 2021-10-23 ENCOUNTER — LAB SERVICES (OUTPATIENT)
Dept: URGENT CARE | Age: 86
End: 2021-10-23

## 2021-10-23 DIAGNOSIS — Z20.822 SUSPECTED COVID-19 VIRUS INFECTION: Primary | ICD-10-CM

## 2021-10-23 LAB
SARS-COV+SARS-COV-2 AG RESP QL IA.RAPID: NOT DETECTED
SERVICE CMNT-IMP: NORMAL

## 2021-10-23 PROCEDURE — 87426 SARSCOV CORONAVIRUS AG IA: CPT | Performed by: INTERNAL MEDICINE

## 2022-08-18 ENCOUNTER — HOSPITAL ENCOUNTER (OUTPATIENT)
Dept: MAMMOGRAPHY | Age: 87
Discharge: HOME | End: 2022-08-18
Attending: FAMILY MEDICINE
Payer: MEDICARE

## 2022-08-18 DIAGNOSIS — Z12.31 ENCOUNTER FOR SCREENING MAMMOGRAM FOR MALIGNANT NEOPLASM OF BREAST: ICD-10-CM

## 2022-08-18 PROCEDURE — 77067 SCR MAMMO BI INCL CAD: CPT | Performed by: FAMILY MEDICINE

## 2022-08-18 PROCEDURE — 77063 BREAST TOMOSYNTHESIS BI: CPT | Performed by: FAMILY MEDICINE

## 2022-09-22 ENCOUNTER — HOSPITAL ENCOUNTER (EMERGENCY)
Age: 87
Discharge: HOME | End: 2022-09-22
Attending: EMERGENCY MEDICINE

## 2022-09-22 VITALS
BODY MASS INDEX: 23.95 KG/M2 | WEIGHT: 122 LBS | HEIGHT: 60 IN | TEMPERATURE: 98 F | OXYGEN SATURATION: 99 % | SYSTOLIC BLOOD PRESSURE: 126 MMHG | DIASTOLIC BLOOD PRESSURE: 40 MMHG | RESPIRATION RATE: 18 BRPM | HEART RATE: 79 BPM

## 2022-09-22 DIAGNOSIS — L03.113 CELLULITIS OF RIGHT UPPER EXTREMITY: ICD-10-CM

## 2022-09-22 DIAGNOSIS — T63.441A BEE STING REACTION, ACCIDENTAL OR UNINTENTIONAL, INITIAL ENCOUNTER: Primary | ICD-10-CM

## 2022-09-22 LAB
ANION GAP SERPL CALC-SCNC: 3 MMOL/L (ref 0–18)
BASOPHILS # BLD AUTO: 0.02 X10(3) UL (ref 0–0.2)
BASOPHILS NFR BLD AUTO: 0.4 %
BUN BLD-MCNC: 16 MG/DL (ref 7–18)
CALCIUM BLD-MCNC: 9 MG/DL (ref 8.5–10.1)
CHLORIDE SERPL-SCNC: 103 MMOL/L (ref 98–112)
CO2 SERPL-SCNC: 29 MMOL/L (ref 21–32)
CREAT BLD-MCNC: 0.82 MG/DL
EOSINOPHIL # BLD AUTO: 0.05 X10(3) UL (ref 0–0.7)
EOSINOPHIL NFR BLD AUTO: 1.1 %
ERYTHROCYTE [DISTWIDTH] IN BLOOD BY AUTOMATED COUNT: 13.7 %
GFR SERPLBLD BASED ON 1.73 SQ M-ARVRAT: 70 ML/MIN/1.73M2 (ref 60–?)
GLUCOSE BLD-MCNC: 99 MG/DL (ref 70–99)
HCT VFR BLD AUTO: 39.6 %
HGB BLD-MCNC: 13.2 G/DL
IMM GRANULOCYTES # BLD AUTO: 0.01 X10(3) UL (ref 0–1)
IMM GRANULOCYTES NFR BLD: 0.2 %
LYMPHOCYTES # BLD AUTO: 1.09 X10(3) UL (ref 1–4)
LYMPHOCYTES NFR BLD AUTO: 23.7 %
MCH RBC QN AUTO: 29.3 PG (ref 26–34)
MCHC RBC AUTO-ENTMCNC: 33.3 G/DL (ref 31–37)
MCV RBC AUTO: 88 FL
MONOCYTES # BLD AUTO: 0.36 X10(3) UL (ref 0.1–1)
MONOCYTES NFR BLD AUTO: 7.8 %
NEUTROPHILS # BLD AUTO: 3.06 X10 (3) UL (ref 1.5–7.7)
NEUTROPHILS # BLD AUTO: 3.06 X10(3) UL (ref 1.5–7.7)
NEUTROPHILS NFR BLD AUTO: 66.8 %
OSMOLALITY SERPL CALC.SUM OF ELEC: 281 MOSM/KG (ref 275–295)
PLATELET # BLD AUTO: 187 10(3)UL (ref 150–450)
POTASSIUM SERPL-SCNC: 5.1 MMOL/L (ref 3.5–5.1)
RBC # BLD AUTO: 4.5 X10(6)UL
SODIUM SERPL-SCNC: 135 MMOL/L (ref 136–145)
WBC # BLD AUTO: 4.6 X10(3) UL (ref 4–11)

## 2022-09-22 PROCEDURE — 96375 TX/PRO/DX INJ NEW DRUG ADDON: CPT

## 2022-09-22 PROCEDURE — 99284 EMERGENCY DEPT VISIT MOD MDM: CPT

## 2022-09-22 PROCEDURE — 96365 THER/PROPH/DIAG IV INF INIT: CPT

## 2022-09-22 PROCEDURE — 80048 BASIC METABOLIC PNL TOTAL CA: CPT | Performed by: PHYSICIAN ASSISTANT

## 2022-09-22 PROCEDURE — 85025 COMPLETE CBC W/AUTO DIFF WBC: CPT | Performed by: PHYSICIAN ASSISTANT

## 2022-09-22 RX ORDER — DIPHENHYDRAMINE HYDROCHLORIDE 50 MG/ML
12.5 INJECTION INTRAMUSCULAR; INTRAVENOUS ONCE
Status: COMPLETED | OUTPATIENT
Start: 2022-09-22 | End: 2022-09-22

## 2022-09-22 RX ORDER — CEPHALEXIN 500 MG/1
500 CAPSULE ORAL 4 TIMES DAILY
Qty: 28 CAPSULE | Refills: 0 | Status: SHIPPED | OUTPATIENT
Start: 2022-09-22 | End: 2022-09-29

## 2023-02-24 ENCOUNTER — APPOINTMENT (OUTPATIENT)
Dept: GENERAL RADIOLOGY | Age: 88
End: 2023-02-24
Attending: EMERGENCY MEDICINE
Payer: MEDICARE

## 2023-02-24 ENCOUNTER — APPOINTMENT (OUTPATIENT)
Dept: ULTRASOUND IMAGING | Age: 88
End: 2023-02-24
Attending: EMERGENCY MEDICINE
Payer: MEDICARE

## 2023-02-24 ENCOUNTER — HOSPITAL ENCOUNTER (EMERGENCY)
Age: 88
Discharge: HOME | End: 2023-02-24
Attending: EMERGENCY MEDICINE
Payer: MEDICARE

## 2023-02-24 VITALS
HEIGHT: 63 IN | TEMPERATURE: 96 F | DIASTOLIC BLOOD PRESSURE: 49 MMHG | BODY MASS INDEX: 23.04 KG/M2 | SYSTOLIC BLOOD PRESSURE: 127 MMHG | WEIGHT: 130 LBS | RESPIRATION RATE: 16 BRPM | OXYGEN SATURATION: 98 % | HEART RATE: 78 BPM

## 2023-02-24 DIAGNOSIS — S80.12XA LEG HEMATOMA, LEFT, INITIAL ENCOUNTER: Primary | ICD-10-CM

## 2023-02-24 PROCEDURE — 93971 EXTREMITY STUDY: CPT | Performed by: EMERGENCY MEDICINE

## 2023-02-24 PROCEDURE — 99284 EMERGENCY DEPT VISIT MOD MDM: CPT

## 2023-02-24 PROCEDURE — 73562 X-RAY EXAM OF KNEE 3: CPT | Performed by: EMERGENCY MEDICINE

## 2023-02-24 PROCEDURE — 73590 X-RAY EXAM OF LOWER LEG: CPT | Performed by: EMERGENCY MEDICINE

## 2023-02-24 NOTE — ED INITIAL ASSESSMENT (HPI)
Left leg injury one week ago, tripped and hit left lower leg on a rock, bruising and swelling to left lower leg, denies fall or hitting head

## (undated) NOTE — ED AVS SNAPSHOT
Shanthi Cee   MRN: [id number]    Department:  [facility] Emergency Department in [place]   Date of Visit:  6/27/2019           Disclosure     Insurance plans vary and the physician(s) referred by the ER may not be covered by your plan.  Please contac tell this physician (or your personal doctor if your instructions are to return to your personal doctor) about any new or lasting problems. The primary care or specialist physician will see patients referred from the [facility] Emergency Department.  Annel Bales

## (undated) NOTE — ED AVS SNAPSHOT
Lexi Sherwood   MRN: [id number]    Department:  [facility] Emergency Department in [place]   Date of Visit:  3/29/2019           Disclosure     Insurance plans vary and the physician(s) referred by the ER may not be covered by your plan.  Please contac tell this physician (or your personal doctor if your instructions are to return to your personal doctor) about any new or lasting problems. The primary care or specialist physician will see patients referred from the [facility] Emergency Department.  Rj Watson

## (undated) NOTE — LETTER
Patient Name: Buddie Soulier  YOB: 1935          MRN number:  [id number]  Date:  8/15/2018  Referring Physician:  Rosa M Motley.  Sammy      Dear Dr. Jaycee Colon,     Discharge Summary    Pt has attended 10, cancelled 0, and no shown 0 visits in Physical progress achieved in PT.- Met    Discharge G Code: Carrying, Moving and Handling Objects CJ: 20-39% impaired, limited, or restricted  Projected G Code: Carrying, Moving and Handling Objects CK: 40-59% impaired, limited, or restricted    Plan: All goals set

## (undated) NOTE — LETTER
Patient Name: Kelly Santo  YOB: 1935          MRN number:  [id number]  Date:  7/11/2018  Referring Physician:  Giuliana Christianson          UPPER EXTREMITY EVALUATION:    Referring Physician: Dr. Carmelo Figueroa  Diagnosis: adhesive capsulitis L restrictions in B pectorals, B upper traps, B SCM and B scalenes, (+) full can LUE, (+) Linares-Jeffrey LUE. These impairments lead to decreased functional use of LUE. Evaluation findings are consistent with diagnosis.   Verl Hotter would benefit from skilled Ph using a dowel. Patient need verbal cues to relax LUE and assist with RUE during dowel exercises. She reported end range pain during exercises with dowel.   Charges: PT Edinson Cheng, Thershantanu Ex1      Total Timed Treatment: 15 min     Total Treatment Ti Thank you for your referral. Please co-sign or sign and return this letter via fax as soon as possible to [phone].  If you have any questions, please contact me at Dept: [phone]    Sincerely,  Electronically signed by therapist: Betty Marques PT    P

## (undated) NOTE — ED AVS SNAPSHOT
Zandra Macias   MRN: [id number]    Department:  [facility] in [place]   Date of Visit:  4/28/2019           Disclosure     Insurance plans vary and the physician(s) referred by the ER may not be covered by your plan.  Please contac tell this physician (or your personal doctor if your instructions are to return to your personal doctor) about any new or lasting problems. The primary care or specialist physician will see patients referred from the [facility] Emergency Department.  Blue Champagne

## (undated) NOTE — ED AVS SNAPSHOT
THE [facility] Emergency Department in [address] [facility]    Phone:  [phone]    Fax:  [facility]   MRN: [id number]    Department:  [facility] Emergency Department in [place]   Date of Visit: Or call [phone]    If you have any problems with your follow-up, please call our  at [phone]    Si usted tiene algun problema con cummings sequimiento, por favor llame a nuestro adminstrador de casos al [phone]    Expect to Pharmacy Address Phone Number   [address] ([address]) [address]  ([address]) [phone]   [facility] significant disc space narrowing.            Dictated by: Sonia Medina MD on 3/27/2017 at 18:26       Approved by: Sonia Medina MD              Narrative:    PROCEDURE:  XR ROUTINE THORACIC SPINE (3 VIEWS) (CPT=72072)     TECHNIQUE:  AP, lateral, and swim

## (undated) NOTE — LETTER
Patient Name: Buddie Soulier  YOB: 1935          MRN number:  [id number]  Date:  8/16/2017  Referring Physician:  Rosa M Motley.  Sammy          UPPER EXTREMITY EVALUATION:    Referring Physician: Dr. Jaycee Colon  Diagnosis: Chronic pain B shoulders Date during all movements except flexion, limited L shoulder mobility, weakness in cervical, scapular and L shoulder musculature, flexibility restrictions in B pectorals, B upper traps, B levators, B SCM, moderate tenderness in L cervical paraspinals, L upper t distraction - (+) increase in pain, Spurling's - (-), Full can - (+) LUE, Reema - (+) LUE    Today’s Treatment and Response: Patient education provided on initial evaluation findings, precautions, treatment plan.  She was also educated on [employer] FOTO: 51/100  Current status G Code:  CK  Goal status G Code:  CK    Patient/Family/Caregiver was advised of these findings, precautions, and treatment options and has agreed to actively participate in planning and for this course of care.     Helene Power

## (undated) NOTE — LETTER
Patient Name: Lexi Sherwood  YOB: 1935          MRN number:  [id number]  Date:  7/30/2021  Referring Physician:  Karolina Butcher.  Sammy    Discharge Summary  Initial Functional Outcome Score 24/100  Final Functional Outcome Score 62/100  Number of restricted to allow increase ease with bending forward to don shoes & pants  (PROGRESSING - pt has met subjective component but not objective component)  · Pt will have decreased paraspinal mm tension to tolerate standing >15 minutes for work and home acti

## (undated) NOTE — LETTER
Patient Name: Dima Henderson  YOB: 1935          MRN number:  [id number]  Date:  7/7/2021  Referring Physician:  Kraig Dukes.  Sammy         SPINE EVALUATION:    Referring Physician: Dr. Brigido Bland  Diagnosis: L sciatica Date of Service: 7/7/2021 POC and HEP. Pt voiced understanding and performs HEP correctly without reported pain. Skilled Physical Therapy is medically necessary to address the above impairments and reach functional goals.      Precautions:  language barrier  OBJECTIVE:   Observatio sitting. Patient was instructed in and issued a HEP for: gentle lumbar mobility (lower trunk rotations in hooklying) & diaphragmatic breathing in hooklying, which were performed today as part of treatment.  Attempted to teach pt to perform posterior pelvic precautions, and treatment options and has agreed to actively participate in planning and for this course of care. Thank you for your referral. Please co-sign or sign and return this letter via fax as soon as possible to [phone].  If you have any qu

## (undated) NOTE — ED AVS SNAPSHOT
Luigirich Davis   MRN: [id number]    Department:  [facility] Emergency Department in [place]   Date of Visit:  4/7/2018           Disclosure     Insurance plans vary and the physician(s) referred by the ER may not be covered by your plan.  Please contact tell this physician (or your personal doctor if your instructions are to return to your personal doctor) about any new or lasting problems. The primary care or specialist physician will see patients referred from the [facility] Emergency Department.  Benjy Ortiz

## (undated) NOTE — ED AVS SNAPSHOT
THE [facility] Emergency Department in [address] [facility]    Phone:  [phone]    Fax:  [facility]   MRN: [id number]    Department:  [facility] Emergency Department in [place]   Date of Visit: IF THERE IS ANY CHANGE OR WORSENING OF YOUR CONDITION, CALL YOUR PRIMARY CARE PHYSICIAN AT ONCE OR RETURN IMMEDIATELY TO THE EMERGENCY DEPARTMENT.     If you have been prescribed any medication(s), please fill your prescription right away and begin taking t

## (undated) NOTE — ED AVS SNAPSHOT
Elba Santos   MRN: [id number]    Department:  [facility] Emergency Department in [place]   Date of Visit:  6/9/2018           Disclosure     Insurance plans vary and the physician(s) referred by the ER may not be covered by your plan.  Please contact tell this physician (or your personal doctor if your instructions are to return to your personal doctor) about any new or lasting problems. The primary care or specialist physician will see patients referred from the [facility] Emergency Department.  Cheryle Levins